# Patient Record
Sex: FEMALE | Race: WHITE | NOT HISPANIC OR LATINO | URBAN - METROPOLITAN AREA
[De-identification: names, ages, dates, MRNs, and addresses within clinical notes are randomized per-mention and may not be internally consistent; named-entity substitution may affect disease eponyms.]

---

## 2021-09-30 ENCOUNTER — OFF PREMISE (OUTPATIENT)
Dept: OTHER | Age: 76
End: 2021-09-30

## 2022-01-06 ENCOUNTER — TELEPHONE (OUTPATIENT)
Dept: OTOLARYNGOLOGY | Facility: CLINIC | Age: 77
End: 2022-01-06

## 2022-01-06 NOTE — TELEPHONE ENCOUNTER
Pt's daughter requesting to leave a message. Pt is having vertigo in both ears. Will you refill rx. Zofran. Send to [facility].   Pt scheduled an appointment on 1-10-22 at 1:10pm.   Call

## 2022-01-09 ENCOUNTER — TELEPHONE (OUTPATIENT)
Dept: OTOLARYNGOLOGY | Facility: CLINIC | Age: 77
End: 2022-01-09

## 2022-01-09 RX ORDER — ONDANSETRON 4 MG/1
4 TABLET, FILM COATED ORAL EVERY 8 HOURS PRN
Qty: 9 TABLET | Refills: 0 | Status: SHIPPED | OUTPATIENT
Start: 2022-01-09 | End: 2022-01-12

## 2022-01-10 ENCOUNTER — OFFICE VISIT (OUTPATIENT)
Dept: OTOLARYNGOLOGY | Facility: CLINIC | Age: 77
End: 2022-01-10
Payer: MEDICARE

## 2022-01-10 VITALS — WEIGHT: 175 LBS | HEIGHT: 64 IN | BODY MASS INDEX: 29.88 KG/M2

## 2022-01-10 DIAGNOSIS — R09.89 BRUIT OF RIGHT CAROTID ARTERY: Primary | ICD-10-CM

## 2022-01-10 DIAGNOSIS — R42 VERTIGO: ICD-10-CM

## 2022-01-10 DIAGNOSIS — H55.00 NYSTAGMUS: ICD-10-CM

## 2022-01-10 PROCEDURE — 99204 OFFICE O/P NEW MOD 45 MIN: CPT | Performed by: SPECIALIST

## 2022-01-10 RX ORDER — PSEUDOEPHEDRINE HCL 30 MG
100 TABLET ORAL DAILY
COMMUNITY
Start: 2020-09-25

## 2022-01-10 RX ORDER — BACLOFEN 10 MG/1
10 TABLET ORAL NIGHTLY
COMMUNITY
Start: 2020-08-26

## 2022-01-10 RX ORDER — METOPROLOL SUCCINATE 25 MG/1
25 TABLET, EXTENDED RELEASE ORAL DAILY
COMMUNITY
Start: 2020-09-26

## 2022-01-10 RX ORDER — PANTOPRAZOLE SODIUM 40 MG/1
1 TABLET, DELAYED RELEASE ORAL
COMMUNITY
Start: 2019-12-07

## 2022-01-10 RX ORDER — FUROSEMIDE 40 MG/1
40 TABLET ORAL
COMMUNITY
Start: 2020-12-03

## 2022-01-10 RX ORDER — DIGOXIN 125 MCG
1 TABLET ORAL
COMMUNITY
Start: 2019-12-17

## 2022-01-10 RX ORDER — FERROUS SULFATE 325(65) MG
325 TABLET ORAL
COMMUNITY
Start: 2020-09-09

## 2022-01-10 RX ORDER — MECLIZINE HCL 12.5 MG/1
12.5 TABLET ORAL 3 TIMES DAILY PRN
Qty: 30 TABLET | Refills: 1 | Status: SHIPPED | OUTPATIENT
Start: 2022-01-10

## 2022-01-10 RX ORDER — CEPHALEXIN 250 MG/1
CAPSULE ORAL
COMMUNITY
Start: 2021-12-09

## 2022-01-10 RX ORDER — LOSARTAN POTASSIUM AND HYDROCHLOROTHIAZIDE 12.5; 1 MG/1; MG/1
1 TABLET ORAL DAILY
COMMUNITY
Start: 2020-08-26

## 2022-01-10 RX ORDER — GABAPENTIN 100 MG/1
CAPSULE ORAL
COMMUNITY
Start: 2020-09-25

## 2022-01-10 RX ORDER — WARFARIN SODIUM 3 MG/1
6 TABLET ORAL DAILY
COMMUNITY
Start: 2020-09-25

## 2022-01-10 RX ORDER — POTASSIUM CHLORIDE 750 MG/1
10 TABLET, EXTENDED RELEASE ORAL
COMMUNITY
Start: 2020-12-07

## 2022-01-10 RX ORDER — ATORVASTATIN CALCIUM 40 MG/1
40 TABLET, FILM COATED ORAL NIGHTLY
COMMUNITY
Start: 2020-09-27

## 2022-01-10 RX ORDER — CEFDINIR 300 MG/1
CAPSULE ORAL
COMMUNITY
Start: 2021-08-17

## 2022-01-10 RX ORDER — GLIMEPIRIDE 2 MG/1
2 TABLET ORAL
COMMUNITY
Start: 2020-08-26

## 2022-01-10 NOTE — PATIENT INSTRUCTIONS
Of a right beating nystagmus. This is worse with your head in the right lateral position. Although it is possibly a benign positional vertigo, cannot rule out other central causes.   I would like you to repeat your audiogram.  I refilled both the Zofran a

## 2022-01-10 NOTE — PROGRESS NOTES
Braden Shane is a 67year old female. Patient presents with:  Dizziness: pt c/o dizziness that started 3 days ago along with some vomiting    HPI:    patient with severe dizziness. Also with nausea and vomiting.   Current Outpatient Medications   Medication denies any unusual skin lesions or rashes  RESPIRATORY: denies shortness of breath with exertion  NEURO: denies headaches    EXAM:   Ht 5' 4\" (1.626 m)   Wt 175 lb (79.4 kg)   BMI 30.04 kg/m²   System Details   Skin Inspection - Normal.   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  - OP REFERRAL TO AUDIOLOGY    3. Nystagmus  Beating nystagmus worse in the right lateral head position, however not extinguished in any position or with repeated maneuvers.   As such, this is not class

## 2022-01-12 ENCOUNTER — TELEPHONE (OUTPATIENT)
Dept: OTOLARYNGOLOGY | Facility: CLINIC | Age: 77
End: 2022-01-12

## 2022-01-12 NOTE — TELEPHONE ENCOUNTER
Rn called pt cardiologist #[phone] and got transferred to Medical record was advised to pt daughter to to give a verbal ok to send a copy of the carotid doppler result,informed pt daughter and will call. today

## 2022-01-12 NOTE — TELEPHONE ENCOUNTER
Asking if fax was received - results from [address] of arteries   Daughter states Dr heard something on the pts right side at last visit - asking if she needs another test

## 2022-01-13 NOTE — TELEPHONE ENCOUNTER
Carotid doppler obtained and less than 50% occlusion bilaterally. Patient is feeling less dizzy. As she cannot get an MRI can consider a CT of the temporal bones if dizziness persists.

## 2022-05-10 ENCOUNTER — OFFICE VISIT (OUTPATIENT)
Dept: OTOLARYNGOLOGY | Facility: CLINIC | Age: 77
End: 2022-05-10
Payer: MEDICARE

## 2022-05-10 VITALS — WEIGHT: 175 LBS | HEIGHT: 64 IN | BODY MASS INDEX: 29.88 KG/M2

## 2022-05-10 DIAGNOSIS — H93.A2 PULSATILE TINNITUS OF LEFT EAR: Primary | ICD-10-CM

## 2022-05-10 DIAGNOSIS — R09.89 BRUIT OF RIGHT CAROTID ARTERY: ICD-10-CM

## 2022-05-10 PROCEDURE — 99213 OFFICE O/P EST LOW 20 MIN: CPT | Performed by: SPECIALIST

## 2022-05-10 RX ORDER — ESTRADIOL 0.1 MG/G
CREAM VAGINAL
COMMUNITY
Start: 2022-01-13

## 2022-05-10 RX ORDER — METOPROLOL TARTRATE 50 MG/1
TABLET, FILM COATED ORAL
COMMUNITY
Start: 2022-03-28

## 2022-05-10 RX ORDER — ASPIRIN 81 MG/1
TABLET, CHEWABLE ORAL
COMMUNITY

## 2022-05-10 RX ORDER — MOMETASONE FUROATE 1 MG/G
CREAM TOPICAL
COMMUNITY
Start: 2022-01-20

## 2022-05-10 NOTE — PATIENT INSTRUCTIONS
With left pulsatile tinnitus. Salt and caffeine. We reviewed your recent carotid Doppler which was normal.  I would like you to get an audiogram to better assess the left-sided hearing.   Cannot get an MRI secondary to your pacemaker, so we would consider a CAT scan should the results be abnormal.

## 2023-02-20 ENCOUNTER — OFFICE VISIT (OUTPATIENT)
Dept: OTOLARYNGOLOGY | Facility: CLINIC | Age: 78
End: 2023-02-20

## 2023-02-20 DIAGNOSIS — H91.93 BILATERAL HEARING LOSS, UNSPECIFIED HEARING LOSS TYPE: ICD-10-CM

## 2023-02-20 DIAGNOSIS — H69.83 DYSFUNCTION OF BOTH EUSTACHIAN TUBES: ICD-10-CM

## 2023-02-20 DIAGNOSIS — R68.2 DRY MOUTH: ICD-10-CM

## 2023-02-20 DIAGNOSIS — J34.2 NASAL SEPTAL DEVIATION: ICD-10-CM

## 2023-02-20 DIAGNOSIS — H93.13 TINNITUS OF BOTH EARS: Primary | ICD-10-CM

## 2023-02-20 DIAGNOSIS — J34.3 NASAL TURBINATE HYPERTROPHY: ICD-10-CM

## 2023-02-20 PROCEDURE — 99213 OFFICE O/P EST LOW 20 MIN: CPT | Performed by: SPECIALIST

## 2023-02-20 RX ORDER — FLUTICASONE PROPIONATE 50 MCG
2 SPRAY, SUSPENSION (ML) NASAL DAILY
Qty: 16 G | Refills: 5 | Status: SHIPPED | OUTPATIENT
Start: 2023-02-20

## 2023-02-20 NOTE — PATIENT INSTRUCTIONS
I placed you on a trial of Flonase for your nasal congestion. You were given a prescription for your bilateral tinnitus. I will of course call you with the results.

## 2023-10-01 ENCOUNTER — TELEPHONE (OUTPATIENT)
Dept: OTOLARYNGOLOGY | Facility: CLINIC | Age: 78
End: 2023-10-01

## 2023-10-01 RX ORDER — MECLIZINE HCL 12.5 MG/1
12.5 TABLET ORAL 3 TIMES DAILY PRN
Qty: 30 TABLET | Refills: 1 | Status: SHIPPED | OUTPATIENT
Start: 2023-10-01

## 2023-11-30 ENCOUNTER — OFFICE VISIT (OUTPATIENT)
Dept: AUDIOLOGY | Facility: CLINIC | Age: 78
End: 2023-11-30

## 2023-11-30 ENCOUNTER — OFFICE VISIT (OUTPATIENT)
Dept: OTOLARYNGOLOGY | Facility: CLINIC | Age: 78
End: 2023-11-30

## 2023-11-30 DIAGNOSIS — R42 DIZZINESS: Primary | ICD-10-CM

## 2023-11-30 DIAGNOSIS — H90.5: ICD-10-CM

## 2023-11-30 DIAGNOSIS — S00.532A HEMATOMA OF TONGUE: ICD-10-CM

## 2023-11-30 DIAGNOSIS — H90.3 SENSORINEURAL HEARING LOSS, BILATERAL: ICD-10-CM

## 2023-11-30 PROCEDURE — 92567 TYMPANOMETRY: CPT | Performed by: AUDIOLOGIST

## 2023-11-30 PROCEDURE — 99213 OFFICE O/P EST LOW 20 MIN: CPT | Performed by: SPECIALIST

## 2023-11-30 PROCEDURE — 92557 COMPREHENSIVE HEARING TEST: CPT | Performed by: AUDIOLOGIST

## 2023-11-30 RX ORDER — MECLIZINE HCL 12.5 MG/1
12.5 TABLET ORAL 3 TIMES DAILY PRN
Qty: 30 TABLET | Refills: 3 | Status: SHIPPED | OUTPATIENT
Start: 2023-11-30

## 2023-11-30 RX ORDER — CEPHALEXIN 250 MG/1
250 CAPSULE ORAL 4 TIMES DAILY
COMMUNITY

## 2023-11-30 RX ORDER — CODEINE PHOSPHATE AND GUAIFENESIN 10; 100 MG/5ML; MG/5ML
5 SOLUTION ORAL EVERY 6 HOURS PRN
Qty: 200 ML | Refills: 0 | Status: SHIPPED | OUTPATIENT
Start: 2023-11-30

## 2023-12-01 NOTE — PROGRESS NOTES
Mohan Luciano is a 77year old female. Chief Complaint   Patient presents with    Dizziness     Patient is here due to dizziness  for a couple months. HPI:   Patient here for dizziness improved with meclizine. Also would like to get her hearing checked. Is on Coumadin. Current Outpatient Medications   Medication Sig Dispense Refill    cephalexin 250 MG Oral Cap Take 1 capsule (250 mg total) by mouth 4 (four) times daily. betamethasone 0.1 % External Cream Apply with fingertip to ear canal at bedtime as needed for itching 15 g 3    meclizine 12.5 MG Oral Tab Take 1 tablet (12.5 mg total) by mouth 3 (three) times daily as needed. 30 tablet 3    guaiFENesin-codeine (CHERATUSSIN AC) 100-10 MG/5ML Oral Solution Take 5 mL by mouth every 6 (six) hours as needed for cough. 200 mL 0    fluticasone propionate 50 MCG/ACT Nasal Suspension 2 sprays by Nasal route daily. 16 g 5    aspirin 81 MG Oral Chew Tab chew 1 tablet (81 mg) by oral route once daily      Mometasone Furoate 0.1 % External Cream APPLY TOPICALLY TO THE AFFECTED AREA ONCE EVERY DAY FOR 7 DAYS      estradiol 0.1 MG/GM Vaginal Cream       Insulin NPH & Regular 70/30 (70-30) 100 UNIT/ML Subcutaneous Suspension Inject 25 units twice daily  RELION brand      metoprolol tartrate 50 MG Oral Tab       atorvastatin 40 MG Oral Tab Take 1 tablet (40 mg total) by mouth nightly. baclofen 10 MG Oral Tab Take 1 tablet (10 mg total) by mouth nightly. digoxin 0.125 MG Oral Tab Take 1 tablet (125 mcg total) by mouth once daily. docusate sodium 100 MG Oral Cap Take 100 mg by mouth daily. Ferrous Sulfate 325 (65 Fe) MG Oral Tab Take 1 tablet (325 mg total) by mouth daily with breakfast.      furosemide 40 MG Oral Tab Take 1 tablet (40 mg total) by mouth.      gabapentin 100 MG Oral Cap TAKE 3 CAPSULE BY MOUTH THREE TIMES DAILY      glimepiride 2 MG Oral Tab 1 tablet (2 mg total).       Losartan Potassium-HCTZ 100-12.5 MG Oral Tab Take 1 tablet by mouth daily. metFORMIN 500 MG Oral Tab Take 2 tablets (1,000 mg total) by mouth 2 (two) times daily with meals. metoprolol succinate 25 MG Oral Tablet 24 Hr Take 1 tablet (25 mg total) by mouth daily. pantoprazole 40 MG Oral Tab EC Take 1 tablet (40 mg total) by mouth once daily. potassium chloride 10 MEQ Oral Tab CR 1 tablet (10 mEq total). warfarin 3 MG Oral Tab 2 tablets (6 mg total) daily. Past Medical History:   Diagnosis Date    Diabetes ([facility] [address]     Essential hypertension       Social History:  Social History     Socioeconomic History    Marital status:    Tobacco Use    Smoking status: Never    Smokeless tobacco: Never   Substance and Sexual Activity    Alcohol use: Never    Drug use: Never        REVIEW OF SYSTEMS:   GENERAL HEALTH: feels well otherwise  GENERAL : denies fever, chills, sweats, weight loss, weight gain  SKIN: denies any unusual skin lesions or rashes  RESPIRATORY: denies shortness of breath with exertion  NEURO: denies headaches    EXAM:   There were no vitals taken for this visit. System Details   Skin Inspection - Normal.   Constitutional Overall appearance - Normal.   Head/Face Facial features - Normal. Eyebrows - Normal. Skull - Normal.   Eyes Conjunctiva - Right: Normal, Left: Normal. Pupil - Right: Normal, Left: Normal.    Ears Inspection - Right: Normal, Left: Normal.   Canal -nonocclusive cerumen fully cleaned  TM - Right: Normal, Left: Normal.   Nasal External nose - Normal.   Nasal septum - Normal.  Turbinates - Normal.   Oral/Oropharynx Lips - Normal, Tonsils - Normal, Tongue -anterior tongue hematoma, no history of trauma. Neck Exam Inspection - Normal. Palpation - Normal. Parotid gland - Normal. Thyroid gland - Normal.  No carotid bruits by auscultation   Lymph Detail Submental. Submandibular.  Anterior cervical. Posterior cervical. Supraclavicular all without enlargement   Psychiatric Orientation - Oriented to time, place, person & situation. Appropriate mood and affect. Neurological Memory - Normal. Cranial nerves - Cranial nerves II through XII grossly intact. ASSESSMENT AND PLAN:   1. Dizziness  Patient taking meclizine overall dizziness symptoms are improved  - [facility]iology [facility]erral - [facility])    2. Hematoma of tongue  On Coumadin. No history of trauma. I asked the patient to follow-up in 3 to 4 weeks time to make sure this resolves. 3. Moderate sensorineural hearing loss  Audiogram reviewed with patient and daughter at the time of the visit. Copy of audiogram given to them. Patient to consider hearing aids. The patient indicates understanding of these issues and agrees to the plan.       Army Mor MD  11/30/2023  9:56 PM

## 2023-12-01 NOTE — PATIENT INSTRUCTIONS
Audiogram shows moderate sensorineural hearing loss which is symmetric. Continue meclizine on an as needed basis for the dizziness. You did have an anterior tongue hematoma. Follow-up in 3 to 4 weeks time to have this rechecked, sooner if problems.

## 2024-05-20 ENCOUNTER — APPOINTMENT (OUTPATIENT)
Dept: URBAN - METROPOLITAN AREA CLINIC 317 | Age: 79
Setting detail: DERMATOLOGY
End: 2024-05-20

## 2024-05-20 DIAGNOSIS — L21.8 OTHER SEBORRHEIC DERMATITIS: ICD-10-CM

## 2024-05-20 PROCEDURE — OTHER MIPS QUALITY: OTHER

## 2024-05-20 PROCEDURE — 99203 OFFICE O/P NEW LOW 30 MIN: CPT

## 2024-05-20 PROCEDURE — OTHER PRESCRIPTION MEDICATION MANAGEMENT: OTHER

## 2024-05-20 PROCEDURE — OTHER PRESCRIPTION: OTHER

## 2024-05-20 PROCEDURE — OTHER COUNSELING: OTHER

## 2024-05-20 RX ORDER — CLOBETASOL PROPIONATE 0.5 MG/ML
SOLUTION TOPICAL
Qty: 50 | Refills: 2 | Status: ERX | COMMUNITY
Start: 2024-05-20

## 2024-05-20 RX ORDER — KETOCONAZOLE 20 MG/ML
SHAMPOO, SUSPENSION TOPICAL TIW
Qty: 120 | Refills: 5 | Status: ERX | COMMUNITY
Start: 2024-05-20

## 2024-05-20 ASSESSMENT — LOCATION DETAILED DESCRIPTION DERM: LOCATION DETAILED: RIGHT SUPERIOR OCCIPITAL SCALP

## 2024-05-20 ASSESSMENT — LOCATION SIMPLE DESCRIPTION DERM: LOCATION SIMPLE: POSTERIOR SCALP

## 2024-05-20 ASSESSMENT — LOCATION ZONE DERM: LOCATION ZONE: SCALP

## 2024-05-20 NOTE — HPI: RASH
What Type Of Note Output Would You Prefer (Optional)?: Bullet Format
How Severe Is Your Rash?: moderate
Is This A New Presentation, Or A Follow-Up?: Rash
Additional History: Patient was given ketoconazole 2% shampoo worked “so so”.

## 2024-05-20 NOTE — PROCEDURE: PRESCRIPTION MEDICATION MANAGEMENT
Initiate Treatment: Ketoconazole 2% shampoo 3x weekly. \\n\\nClobetasol 0.05% solution BID x2 weeks
Render In Strict Bullet Format?: No
Detail Level: Zone

## 2024-05-20 NOTE — PROCEDURE: MIPS QUALITY
Detail Level: Detailed
Quality 226: Preventive Care And Screening: Tobacco Use: Screening And Cessation Intervention: Patient screened for tobacco use and is an ex/non-smoker
Quality 130: Documentation Of Current Medications In The Medical Record: Current Medications Documented
Quality 47: Advance Care Plan: Advance Care Planning discussed and documented; advance care plan or surrogate decision maker documented in the medical record.
Quality 358: Patient-Centered Surgical Risk Assessment And Communication: Documentation of patient-specific risk assessment with a risk calculator based on multi-institutional clinical data, the specific risk calculator used, and communication of risk assessment from risk calculator with the patient or family.

## 2024-07-25 ENCOUNTER — APPOINTMENT (OUTPATIENT)
Dept: URBAN - METROPOLITAN AREA CLINIC 317 | Age: 79
Setting detail: DERMATOLOGY
End: 2024-07-25

## 2024-07-25 DIAGNOSIS — L21.8 OTHER SEBORRHEIC DERMATITIS: ICD-10-CM

## 2024-07-25 PROCEDURE — OTHER COUNSELING: OTHER

## 2024-07-25 PROCEDURE — OTHER PRESCRIPTION MEDICATION MANAGEMENT: OTHER

## 2024-07-25 PROCEDURE — 99213 OFFICE O/P EST LOW 20 MIN: CPT

## 2024-07-25 PROCEDURE — OTHER MIPS QUALITY: OTHER

## 2024-07-25 ASSESSMENT — LOCATION SIMPLE DESCRIPTION DERM: LOCATION SIMPLE: POSTERIOR SCALP

## 2024-07-25 ASSESSMENT — LOCATION DETAILED DESCRIPTION DERM: LOCATION DETAILED: RIGHT SUPERIOR OCCIPITAL SCALP

## 2024-07-25 ASSESSMENT — LOCATION ZONE DERM: LOCATION ZONE: SCALP

## 2024-07-25 NOTE — PROCEDURE: PRESCRIPTION MEDICATION MANAGEMENT
Discontinue Regimen: Clobetasol 0.05% solution BID x2 weeks
Continue Regimen: Ketoconazole 2% shampoo 3x weekly.
Render In Strict Bullet Format?: No
Detail Level: Zone

## 2025-07-15 DIAGNOSIS — J98.4 DISEASE OF LUNG: ICD-10-CM

## 2025-07-15 DIAGNOSIS — R93.89 MEDIASTINAL SHIFT: ICD-10-CM

## 2025-07-15 DIAGNOSIS — R06.02 SHORTNESS OF BREATH: Primary | ICD-10-CM

## 2025-07-15 DIAGNOSIS — J44.9 VANISHING LUNG  (CMD): ICD-10-CM

## 2025-08-15 ENCOUNTER — HOSPITAL ENCOUNTER (OUTPATIENT)
Age: 80
End: 2025-08-15
Attending: INTERNAL MEDICINE

## 2025-08-23 ENCOUNTER — HOSPITAL ENCOUNTER (OUTPATIENT)
Age: 80
End: 2025-08-23
Attending: INTERNAL MEDICINE

## 2025-08-23 DIAGNOSIS — R06.02 SHORTNESS OF BREATH: ICD-10-CM

## 2025-08-23 DIAGNOSIS — R93.89 MEDIASTINAL SHIFT: ICD-10-CM

## 2025-08-23 DIAGNOSIS — J98.4 DISEASE OF LUNG: ICD-10-CM

## 2025-08-23 DIAGNOSIS — J44.9 VANISHING LUNG  (CMD): ICD-10-CM

## 2025-08-23 PROCEDURE — 71250 CT THORAX DX C-: CPT

## 2025-09-02 DIAGNOSIS — J98.4 CALCIFICATION OF LUNG: ICD-10-CM

## 2025-09-02 DIAGNOSIS — R06.02 SHORTNESS OF BREATH: Primary | ICD-10-CM

## 2025-09-02 DIAGNOSIS — R93.89 MEDIASTINAL SHIFT: ICD-10-CM

## 2025-09-02 DIAGNOSIS — J44.9 COPD (CHRONIC OBSTRUCTIVE PULMONARY DISEASE)  (CMD): ICD-10-CM

## 2025-09-02 DIAGNOSIS — R91.1 LUNG NODULE: ICD-10-CM
